# Patient Record
Sex: MALE | Race: WHITE | NOT HISPANIC OR LATINO | Employment: OTHER | URBAN - METROPOLITAN AREA
[De-identification: names, ages, dates, MRNs, and addresses within clinical notes are randomized per-mention and may not be internally consistent; named-entity substitution may affect disease eponyms.]

---

## 2020-01-19 LAB — HCV AB SER-ACNC: NORMAL

## 2020-02-09 LAB — HCV AB SER-ACNC: NON REACTIVE

## 2020-03-06 ENCOUNTER — OFFICE VISIT (OUTPATIENT)
Dept: GASTROENTEROLOGY | Facility: CLINIC | Age: 85
End: 2020-03-06
Payer: MEDICARE

## 2020-03-06 VITALS
WEIGHT: 210 LBS | HEART RATE: 78 BPM | DIASTOLIC BLOOD PRESSURE: 72 MMHG | BODY MASS INDEX: 26.95 KG/M2 | HEIGHT: 74 IN | SYSTOLIC BLOOD PRESSURE: 118 MMHG

## 2020-03-06 DIAGNOSIS — I50.9 CHRONIC CONGESTIVE HEART FAILURE, UNSPECIFIED HEART FAILURE TYPE (HCC): ICD-10-CM

## 2020-03-06 DIAGNOSIS — R79.89 ELEVATED LFTS: ICD-10-CM

## 2020-03-06 DIAGNOSIS — Z95.810 AICD (AUTOMATIC CARDIOVERTER/DEFIBRILLATOR) PRESENT: ICD-10-CM

## 2020-03-06 DIAGNOSIS — I25.5 ISCHEMIC CARDIOMYOPATHY: ICD-10-CM

## 2020-03-06 DIAGNOSIS — Z95.0 PRESENCE OF BIVENTRICULAR CARDIAC PACEMAKER: ICD-10-CM

## 2020-03-06 DIAGNOSIS — Z99.81 ON HOME OXYGEN THERAPY: ICD-10-CM

## 2020-03-06 DIAGNOSIS — Z86.010 PERSONAL HISTORY OF COLONIC POLYPS: ICD-10-CM

## 2020-03-06 DIAGNOSIS — K83.1 COMMON BILE DUCT STRICTURE: Primary | ICD-10-CM

## 2020-03-06 DIAGNOSIS — J44.9 CHRONIC OBSTRUCTIVE PULMONARY DISEASE, UNSPECIFIED COPD TYPE (HCC): ICD-10-CM

## 2020-03-06 PROCEDURE — 99214 OFFICE O/P EST MOD 30 MIN: CPT | Performed by: NURSE PRACTITIONER

## 2020-03-06 RX ORDER — FLUTICASONE PROPIONATE 50 MCG
1 SPRAY, SUSPENSION (ML) NASAL DAILY
COMMUNITY

## 2020-03-06 RX ORDER — VALSARTAN 40 MG/1
20 TABLET ORAL DAILY
COMMUNITY

## 2020-03-06 RX ORDER — ARFORMOTEROL TARTRATE 15 UG/2ML
15 SOLUTION RESPIRATORY (INHALATION) 2 TIMES DAILY
COMMUNITY

## 2020-03-06 RX ORDER — ALBUTEROL SULFATE 90 UG/1
2 AEROSOL, METERED RESPIRATORY (INHALATION) AS NEEDED
COMMUNITY

## 2020-03-06 RX ORDER — ATORVASTATIN CALCIUM 80 MG/1
80 TABLET, FILM COATED ORAL DAILY
COMMUNITY
Start: 2020-02-24

## 2020-03-06 RX ORDER — FAMOTIDINE 20 MG/1
20 TABLET, FILM COATED ORAL 2 TIMES DAILY
COMMUNITY
Start: 2020-02-04

## 2020-03-06 RX ORDER — PHENOL 1.4 %
10 AEROSOL, SPRAY (ML) MUCOUS MEMBRANE AS NEEDED
COMMUNITY

## 2020-03-06 RX ORDER — CARVEDILOL 3.12 MG/1
3.12 TABLET ORAL 2 TIMES DAILY
COMMUNITY

## 2020-03-06 RX ORDER — BUDESONIDE 0.25 MG/2ML
0.25 INHALANT ORAL 2 TIMES DAILY
COMMUNITY

## 2020-03-06 RX ORDER — BUSPIRONE HYDROCHLORIDE 10 MG/1
10 TABLET ORAL DAILY
COMMUNITY

## 2020-03-06 RX ORDER — MULTIVITAMIN
1 CAPSULE ORAL DAILY
COMMUNITY

## 2020-03-06 RX ORDER — BUPROPION HYDROCHLORIDE 150 MG/1
150 TABLET ORAL DAILY
COMMUNITY
Start: 2020-02-24

## 2020-03-06 NOTE — PROGRESS NOTES
[address] [facility] - Outpatient Consultation  Ashu Benitez 82 y o  male MRN: [id number]  Encounter: [id number]    ASSESSMENT AND PLAN:      1  Common bile duct stricture  2  Elevated LFTs  Admitted to [facility] with jaundice in January  CT scan showed intra and extrahepatic ductal dilation but no obvious pancreatic head mass  Bilirubin was 12 on admission  Incidental 1 9 cm cystic tail mass was noted  The case was discussed by Dr Pat Mccallum with [place] Lees [place] [facility]  [place] [facility] [place] had ERCP with metal stent placement by Dr Barbara Otero  According to the daughter, the pathology was atypical but otherwise indeterminate  A repeat biopsy was suggested as a way to possibly confirm a more definitive diagnosis  Lengthy discussion with the patient and his daughter  Given the patient's multiple comorbidities and the fact that he is asymptomatic with the metal stent, advisable to continue observing with no further interventions  Transaminases are now normal   T bili and alk-phos are still mildly elevated  Given his significant multiple medical comorbidities, doubtful he could tolerate a Whipple procedure if there was a more definitive diagnosis, including pancreatic neoplasm  According to the daughter, the patient's PCP is in agreement with this plan as well  She advises that Dr Donna boo will be repeating labs and ultrasound or CT imaging every 3 months to follow     -will repeat LFTs in 6 months and follow up in our office  -advised daughter to ensure that laboratory and imaging results are copied to our office for review  -they will return to our office sooner if there is any change in condition, return of jaundice  - Hepatic function panel; Future    3  Personal history of colonic polyps  High risk  Last colonoscopy 05/30/2017 which showed some small internal hemorrhoids and adenomatous polyps    No recall is advised due to the patient's advanced age  4  Ischemic cardiomyopathy  5  Chronic congestive heart failure, unspecified heart failure type ([facility] )  6  Presence of biventricular cardiac pacemaker  7  AICD (automatic cardioverter/defibrillator) present  Follows with [facility], Dr Jordan Kang  8  Chronic obstructive pulmonary disease, unspecified COPD type ([facility] )  9  On home oxygen therapy  Follows with pulmonary, Dr Rosa Avila  Followup Appointment:  6 months______________________________________________________________________    Chief Complaint   Patient presents with    Follow up to gallbladder stent at [facility]       HPI:   Milad Galindo is a 82 y o   male who presents to follow up his previous CBD stenting for stricture, obstruction  He was hospitalized at [facility] in January for painless jaundice  CT imaging found a distal CBD stricture but no obvious pancreatic head mass  Dr Karyle Aland discussed with Dr Wei Farah at [facility] who agreed to accept patient for transfer  The patient had an ERCP with metal stent placement by Dr Reny Turner at [facility]  After he returned home, he had increasing fatigue, was readmitted to [facility]   At that time, it was felt that most of his symptoms were related to over diuresis from his chronic heart failure and cardiomyopathy  At that time, we evaluated the patient  His LFTs were trending down  A subsequent ultrasound showed only that his gallbladder was contracted with some stones, fatty changes to the liver and a CBD measuring 5 mm  There was some air in the biliary tree which may have been secondary to the stenting  He was tolerating full meals and continues to tolerate  Says his appetite is normal   Denies any chest pain, UGI or alarm symptoms, nausea or vomiting  He is having no abdominal pain, normal formed bowel movements and denies dark urine, naren-colored stools, jaundice, pruritus, confusion    No fevers or jose antonio  The patient and daughter agree that he is maintained currently at his baseline given all his multiple medical comorbidities  No significant weight gains and denies unintended weight loss      Historical Information   Past Medical History:   Diagnosis Date    [facility]     CHF (congestive heart failure) (HCC)     Colon polyp     Common bile duct stricture     COPD (chronic obstructive pulmonary disease) (HCC)     Coronary artery disease     Heart attack ([facility] ) 2014    Hiatal hernia     Ischemic cardiomyopathy     On home oxygen therapy     Sleep apnea     Thoracic aortic aneurysm [facility])      Past Surgical History:   Procedure Laterality Date    BILE DUCT STENT PLACEMENT  02/2020    Done at [address] pacemaker with [address] [facility]      Biventricular pacemaker with AICD    COLONOSCOPY  05/30/2017    COLONOSCOPY  05/30/2017     small internal hemorrhoids and polyps -1 adenomatous and 1 hyperplastic    CORONARY STENT PLACEMENT      INGUINAL HERNIA REPAIR Bilateral      Social History     Substance and Sexual Activity   Alcohol Use Not Currently    Comment: Denies use     Social History     Substance and Sexual Activity   Drug Use Not on file     Social History     Tobacco Use   Smoking Status Former Smoker    Types: Cigarettes   Smokeless Tobacco Never Used     Family History   Problem Relation Age of Onset    No Known Problems Mother     Heart disease Father     GI problems Neg Hx     Colon polyps Neg Hx     Irritable bowel syndrome Neg Hx     Colon cancer Neg Hx        Meds/Allergies     Current Outpatient Medications:     albuterol (PROVENTIL HFA,VENTOLIN HFA) 90 mcg/act inhaler    arformoterol (BROVANA) 15 mcg/2 mL nebulizer solution    aspirin 81 MG tablet    atorvastatin (LIPITOR) 80 mg tablet    budesonide (PULMICORT) 0 25 mg/2 mL nebulizer solution    buPROPion (WELLBUTRIN XL) 150 mg 24 hr tablet    busPIRone (BUSPAR) 10 mg tablet    carvedilol (COREG) 3 125 mg tablet    famotidine (PEPCID) 20 mg tablet    fluticasone (FLONASE) 50 mcg/act nasal spray    ipratropium (ATROVENT) 0 02 % nebulizer solution    Melatonin 10 MG TABS    Multiple Vitamin (MULTIVITAMIN) capsule    Multiple Vitamins-Minerals (ICAPS AREDS 2 PO)    valsartan (DIOVAN) 40 mg tablet    Allergies   Allergen Reactions    Ambien [Zolpidem] Delirium    Ativan [Lorazepam] Delirium    Valium [Diazepam] Delirium    Xanax [Alprazolam] Delirium       PHYSICAL EXAM:    Blood pressure 118/72, pulse 78, height 6' 2" (1 88 m), weight 95 3 kg (210 lb)  Body mass index is 26 96 kg/m²  General Appearance: NAD, cooperative, alert, wearing portable oxygen   Head:  Normocephalic, atraumatic  Eyes: Anicteric, PERRLA, EOMI  ENT:  Normal external appearance, normal mucosa  Neck:  Supple, symmetrical, trachea midline,   Resp:   decreased with bibasilar rales to auscultation bilaterally; no rhonchi or wheezing; respirations unlabored  at rest, +BOYLE and the patient is wearing continuous portable oxygen  CV:  S1 S2, Regular rate and rhythm; no murmur, rub, or gallop  GI:  Soft, non-tender, non-distended; normal bowel sounds; no masses, no organomegaly   Rectal: Deferred  Musculoskeletal: No cyanosis, clubbing or edema  Normal ROM    Skin:  No jaundice, rashes, or lesions   Heme/Lymph: No palpable cervical lymphadenopathy  Psych: Normal affect, good eye contact  Neuro: No gross deficits, AAOx3    Lab Results:  Reviewed multiple lab results from January and February, 2020 per [facility] check [facility]      No results found for: WBC, HGB, HCT, MCV, PLT  No results found for: NA, K, CL, CO2, ANIONGAP, BUN, CREATININE, GLUCOSE, GLUF, CALCIUM, CORRECTEDCA, AST, ALT, ALKPHOS, PROT, BILITOT, EGFR  No results found for: IRON, TIBC, FERRITIN  No results found for: LIPASE    Radiology Results:   Reviewed multiple CT and ultrasound imaging studies per [facility] from January and February 2020  REVIEW OF SYSTEMS:    CONSTITUTIONAL: Denies any fever, chills, rigors, and weight loss  Reports fatigue  HEENT: No earache or tinnitus  Denies hearing loss or visual disturbances  CARDIOVASCULAR: No chest pain or palpitations  RESPIRATORY: Denies any cough, hemoptysis but reports shortness of breath and dyspnea on exertion  GASTROINTESTINAL: As noted in the History of Present Illness  GENITOURINARY: No problems with urination  Denies any hematuria or dysuria  NEUROLOGIC: No dizziness or vertigo, denies headaches  MUSCULOSKELETAL: Denies any muscle or joint pain  SKIN: Denies skin rashes or itching  ENDOCRINE: Denies excessive thirst  Denies intolerance to heat or cold  PSYCHOSOCIAL: Denies anxiety but reports depression and difficulty sleeping  Denies any recent memory loss

## 2020-03-10 ENCOUNTER — TELEPHONE (OUTPATIENT)
Dept: GASTROENTEROLOGY | Facility: CLINIC | Age: 85
End: 2020-03-10

## 2020-03-10 NOTE — TELEPHONE ENCOUNTER
Pt's daughter, Lotus Oliveros, states they were here [facility]  She has ques re: his common bile duct stricture obstruction w/ stent; states they discussed will not be repeating bx due to age; has ques re: suspecting CA and if so what prognosis would be? Also wants to make sure it's OK for him to continue [address]?  CB# to daughter [phone]

## 2020-03-11 NOTE — TELEPHONE ENCOUNTER
Okay for Ravi  If this is a bile duct cancer as we suspect, these are notoriously slow growing and most of them cause symptoms by obstructing the bile duct  With the stent in that will be an issue    Although we can't predict the future and how fast it is growing,  getting several good quality years in this situation is very common

## 2020-03-11 NOTE — TELEPHONE ENCOUNTER
Hello! This is the patient that we discussed the other day, they lucero that you sent down from [facility] for the bile duct stent  Please see the commentary below from the patient's daughter  Water your suggestions for what I might say?     Thanks, HPR

## 2020-03-12 NOTE — TELEPHONE ENCOUNTER
I did call and speak directly with the patient's daughter this evening  I explained everything as per Dr Timmie Skiff recommendations below  I advised that obviously, if significant changes occur with jaundice or increased liver enzymes we would have to revisit are answers but we feel fairly confident that if there is a mass, it may be very slow growing and with the stent in place obstruction will not be an issue  She agrees that we would not want to further compromise his health trying to find an answer that may not be relevant to his current health  She will follow-up with lab testing as per PCP and our office, will continue to follow up with us with her father and notify us of any significant health changes      Thanks HPR